# Patient Record
Sex: FEMALE | URBAN - METROPOLITAN AREA
[De-identification: names, ages, dates, MRNs, and addresses within clinical notes are randomized per-mention and may not be internally consistent; named-entity substitution may affect disease eponyms.]

---

## 2021-02-18 ENCOUNTER — APPOINTMENT (RX ONLY)
Dept: URBAN - METROPOLITAN AREA CLINIC 133 | Facility: CLINIC | Age: 31
Setting detail: DERMATOLOGY
End: 2021-02-18

## 2021-02-18 VITALS — TEMPERATURE: 97.3 F | HEIGHT: 62 IN | WEIGHT: 180 LBS

## 2021-02-18 VITALS — WEIGHT: 180 LBS | HEIGHT: 62 IN

## 2021-02-18 DIAGNOSIS — L21.8 OTHER SEBORRHEIC DERMATITIS: ICD-10-CM

## 2021-02-18 PROCEDURE — ? TREATMENT REGIMEN

## 2021-02-18 PROCEDURE — ? OBSERVATION

## 2021-02-18 PROCEDURE — ? PRESCRIPTION

## 2021-02-18 PROCEDURE — 99203 OFFICE O/P NEW LOW 30 MIN: CPT

## 2021-02-18 PROCEDURE — ? COUNSELING

## 2021-02-18 RX ORDER — HYDROCORTISONE 25 MG/G
CREAM TOPICAL
Qty: 1 | Refills: 1 | Status: ERX | COMMUNITY
Start: 2021-02-18

## 2021-02-18 RX ORDER — KETOCONAZOLE 20 MG/ML
SHAMPOO, SUSPENSION TOPICAL BIW
Qty: 1 | Refills: 3 | Status: ERX | COMMUNITY
Start: 2021-02-18

## 2021-02-18 RX ADMIN — KETOCONAZOLE: 20 SHAMPOO, SUSPENSION TOPICAL at 00:00

## 2021-02-18 RX ADMIN — HYDROCORTISONE: 25 CREAM TOPICAL at 00:00

## 2021-02-18 ASSESSMENT — LOCATION SIMPLE DESCRIPTION DERM
LOCATION SIMPLE: LEFT FOREHEAD
LOCATION SIMPLE: POSTERIOR SCALP

## 2021-02-18 ASSESSMENT — LOCATION DETAILED DESCRIPTION DERM
LOCATION DETAILED: POSTERIOR MID-PARIETAL SCALP
LOCATION DETAILED: LEFT SUPERIOR MEDIAL FOREHEAD

## 2021-02-18 ASSESSMENT — LOCATION ZONE DERM
LOCATION ZONE: SCALP
LOCATION ZONE: FACE

## 2021-02-18 NOTE — HPI: RASH
What Type Of Note Output Would You Prefer (Optional)?: Standard Output
Is The Patient Presenting As Previously Scheduled?: Yes
How Severe Is Your Rash?: mild
Is This A New Presentation, Or A Follow-Up?: Rash
Additional History: Patient states she regularly uses TGel extra strength, which helps to control her dandruff. \\nPatient stated she’s had a similar rash before on the back of her scalp that resolved on its own. Patient has noticed an increase in hair loss within the last few weeks, but unsure if it’s related.

## 2021-02-18 NOTE — PROCEDURE: TREATMENT REGIMEN
Detail Level: Zone
Initiate Treatment: Ketoconazole shampoo - alternate between regular shampoo (T-gel) \\nHydrocortisone 2.5% cream to affected areas on forehead

## 2021-03-16 ENCOUNTER — APPOINTMENT (RX ONLY)
Dept: URBAN - METROPOLITAN AREA CLINIC 133 | Facility: CLINIC | Age: 31
Setting detail: DERMATOLOGY
End: 2021-03-16

## 2021-03-16 DIAGNOSIS — L63.8 OTHER ALOPECIA AREATA: ICD-10-CM

## 2021-03-16 DIAGNOSIS — L21.8 OTHER SEBORRHEIC DERMATITIS: ICD-10-CM

## 2021-03-16 PROBLEM — L30.9 DERMATITIS, UNSPECIFIED: Status: ACTIVE | Noted: 2021-03-16

## 2021-03-16 PROCEDURE — ? TREATMENT REGIMEN

## 2021-03-16 PROCEDURE — 99213 OFFICE O/P EST LOW 20 MIN: CPT

## 2021-03-16 PROCEDURE — ? PHOTO-DOCUMENTATION

## 2021-03-16 PROCEDURE — ? PRESCRIPTION

## 2021-03-16 PROCEDURE — ? OBSERVATION

## 2021-03-16 PROCEDURE — ? COUNSELING

## 2021-03-16 PROCEDURE — ? OBSERVATION AND MEASURE

## 2021-03-16 RX ORDER — CLOBETASOL PROPIONATE 0.5 MG/ML
SOLUTION TOPICAL
Qty: 1 | Refills: 1 | Status: ERX | COMMUNITY
Start: 2021-03-16

## 2021-03-16 RX ADMIN — CLOBETASOL PROPIONATE: 0.5 SOLUTION TOPICAL at 00:00

## 2021-03-16 ASSESSMENT — LOCATION SIMPLE DESCRIPTION DERM
LOCATION SIMPLE: ANTERIOR SCALP
LOCATION SIMPLE: LEFT FOREHEAD

## 2021-03-16 ASSESSMENT — LOCATION DETAILED DESCRIPTION DERM
LOCATION DETAILED: LEFT SUPERIOR MEDIAL FOREHEAD
LOCATION DETAILED: MID-FRONTAL SCALP

## 2021-03-16 ASSESSMENT — LOCATION ZONE DERM
LOCATION ZONE: FACE
LOCATION ZONE: SCALP

## 2021-03-16 NOTE — PROCEDURE: OBSERVATION
Detail Level: Detailed
Size Of Lesion: 2x2.5cm
Detail Level: Simple
X Size Of Lesion In Cm (Optional): 0

## 2021-03-16 NOTE — PROCEDURE: TREATMENT REGIMEN
Plan: Consider intralesional kenalog if not improved or worsening
Detail Level: Zone
Continue Regimen: Ketoconazole shampoo - alternate between regular shampoo (T-gel)
Discontinue Regimen: Hydrocortisone 2.5% cream
Plan: If not improved in one month, discussed a biopsy to r/o lupus or other photosensitive conditions. Per patient, no family history of lupus, no personal history of autoimmune diseases.\\nInformed to use clobetasol solution once daily for 2 weeks and then stop. Counseled that she shouldn’t put anything on the area for at least 1 week before followup in anticipation of possible biopsy.
Initiate Treatment: Clobetasol solution once daily (lesion is along frontal hairline)
Initiate Treatment: Clobetasol solution 1-2 times daily

## 2021-09-15 ENCOUNTER — APPOINTMENT (RX ONLY)
Dept: URBAN - METROPOLITAN AREA CLINIC 133 | Facility: CLINIC | Age: 31
Setting detail: DERMATOLOGY
End: 2021-09-15

## 2021-09-15 VITALS — HEIGHT: 61 IN | WEIGHT: 195 LBS

## 2021-09-15 VITALS — HEIGHT: 61 IN | WEIGHT: 203 LBS

## 2021-09-15 DIAGNOSIS — L21.8 OTHER SEBORRHEIC DERMATITIS: ICD-10-CM | Status: INADEQUATELY CONTROLLED

## 2021-09-15 PROBLEM — L30.9 DERMATITIS, UNSPECIFIED: Status: ACTIVE | Noted: 2021-09-15

## 2021-09-15 PROCEDURE — ? COUNSELING

## 2021-09-15 PROCEDURE — 11104 PUNCH BX SKIN SINGLE LESION: CPT

## 2021-09-15 PROCEDURE — ? ADDITIONAL NOTES

## 2021-09-15 PROCEDURE — ? BIOPSY BY PUNCH METHOD

## 2021-09-15 ASSESSMENT — LOCATION ZONE DERM: LOCATION ZONE: FACE

## 2021-09-15 ASSESSMENT — LOCATION SIMPLE DESCRIPTION DERM: LOCATION SIMPLE: LEFT FOREHEAD

## 2021-09-15 ASSESSMENT — LOCATION DETAILED DESCRIPTION DERM: LOCATION DETAILED: LEFT SUPERIOR FOREHEAD

## 2021-09-15 NOTE — PROCEDURE: ADDITIONAL NOTES
Additional Notes: Patient was using clobetasol solution but will hold off on additional treatment recommendations pending biopsy results.\\nWe did discuss wearing sunscreen daily.
Detail Level: Simple
Render Risk Assessment In Note?: no

## 2021-09-15 NOTE — PROCEDURE: BIOPSY BY PUNCH METHOD
X Size Of Lesion In Cm (Optional): 0
Home Suture Removal Text: Patient was provided a home suture removal kit and will remove their sutures at home.  If they have any questions or difficulties they will call the office.
Hemostasis: None
Suture Removal: 7 days
Post-Care Instructions: I reviewed with the patient in detail post-care instructions. Patient is to keep the biopsy site dry overnight, and then apply bacitracin twice daily until healed. Patient may apply hydrogen peroxide soaks to remove any crusting.
Notification Instructions: Patient will be notified of biopsy results. However, patient instructed to call the office if not contacted within 2 weeks.
Patient Will Remove Sutures At Home?: No
Dressing: bandage
Consent: Written consent was obtained and risks were reviewed including but not limited to scarring, infection, bleeding, scabbing, incomplete removal, nerve damage and allergy to anesthesia.
Information: Selecting Yes will display possible errors in your note based on the variables you have selected. This validation is only offered as a suggestion for you. PLEASE NOTE THAT THE VALIDATION TEXT WILL BE REMOVED WHEN YOU FINALIZE YOUR NOTE. IF YOU WANT TO FAX A PRELIMINARY NOTE YOU WILL NEED TO TOGGLE THIS TO 'NO' IF YOU DO NOT WANT IT IN YOUR FAXED NOTE.
Epidermal Sutures: 6-0 Ethilon
Detail Level: Detailed
Biopsy Type: H and E
Anesthesia Volume In Cc: 0.5
Billing Type: Third-Party Bill
Wound Care: Petrolatum
Was A Bandage Applied: Yes
Punch Size In Mm: 3
Anesthesia Type: 1% lidocaine with epinephrine
Path Notes Override (Will Replace All Of The Above Text): Lesion has been present for 6+ months. Started as erythematous annular plaque (see photos in chart from March 16) and has evolved to a uniform plaque.

## 2021-09-22 ENCOUNTER — APPOINTMENT (RX ONLY)
Dept: URBAN - METROPOLITAN AREA CLINIC 133 | Facility: CLINIC | Age: 31
Setting detail: DERMATOLOGY
End: 2021-09-22

## 2021-09-22 DIAGNOSIS — L93.2 OTHER LOCAL LUPUS ERYTHEMATOSUS: ICD-10-CM | Status: INADEQUATELY CONTROLLED

## 2021-09-22 DIAGNOSIS — M79.3 PANNICULITIS, UNSPECIFIED: ICD-10-CM

## 2021-09-22 DIAGNOSIS — Z48.02 ENCOUNTER FOR REMOVAL OF SUTURES: ICD-10-CM

## 2021-09-22 PROCEDURE — ? COUNSELING

## 2021-09-22 PROCEDURE — ? SUTURE REMOVAL (GLOBAL PERIOD)

## 2021-09-22 PROCEDURE — ? DEFER

## 2021-09-22 PROCEDURE — ? ORDER TESTS

## 2021-09-22 PROCEDURE — ? PRESCRIPTION

## 2021-09-22 PROCEDURE — 99214 OFFICE O/P EST MOD 30 MIN: CPT | Mod: 25

## 2021-09-22 PROCEDURE — ? TREATMENT REGIMEN

## 2021-09-22 PROCEDURE — ? INTRALESIONAL KENALOG

## 2021-09-22 PROCEDURE — 11900 INJECT SKIN LESIONS </W 7: CPT

## 2021-09-22 RX ORDER — HYDROXYCHLOROQUINE SULFATE 200 MG/1
TABLET, FILM COATED ORAL
Qty: 60 | Refills: 5 | Status: ERX | COMMUNITY
Start: 2021-09-22

## 2021-09-22 RX ORDER — BETAMETHASONE DIPROPIONATE 0.5 MG/G
OINTMENT TOPICAL
Qty: 45 | Refills: 0 | Status: ERX | COMMUNITY
Start: 2021-09-22

## 2021-09-22 RX ADMIN — HYDROXYCHLOROQUINE SULFATE: 200 TABLET, FILM COATED ORAL at 00:00

## 2021-09-22 RX ADMIN — BETAMETHASONE DIPROPIONATE: 0.5 OINTMENT TOPICAL at 00:00

## 2021-09-22 ASSESSMENT — LOCATION DETAILED DESCRIPTION DERM
LOCATION DETAILED: LEFT NASAL SIDEWALL
LOCATION DETAILED: LEFT MEDIAL FRONTAL SCALP
LOCATION DETAILED: LEFT SUPERIOR FOREHEAD
LOCATION DETAILED: LEFT SUPERIOR MEDIAL FOREHEAD
LOCATION DETAILED: RIGHT PROXIMAL POSTERIOR UPPER ARM
LOCATION DETAILED: RIGHT ANTERIOR LATERAL PROXIMAL THIGH

## 2021-09-22 ASSESSMENT — LOCATION ZONE DERM
LOCATION ZONE: NOSE
LOCATION ZONE: ARM
LOCATION ZONE: SCALP
LOCATION ZONE: FACE
LOCATION ZONE: LEG

## 2021-09-22 ASSESSMENT — LOCATION SIMPLE DESCRIPTION DERM
LOCATION SIMPLE: LEFT FOREHEAD
LOCATION SIMPLE: RIGHT THIGH
LOCATION SIMPLE: LEFT SCALP
LOCATION SIMPLE: LEFT NOSE
LOCATION SIMPLE: RIGHT POSTERIOR UPPER ARM

## 2021-09-22 NOTE — PROCEDURE: TREATMENT REGIMEN
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 She declined biopsy today for the panniculitis.\\nLabs ordered to evaluate for SLE screen.\\nNo response of rash on face to topical steroids, performed intralesional kenalog injection to plaques on forehead and scalp.\\nInitiating plaquenil. Discussed baseline ophthalmology exam within the next few months.\\nFollow up 1 month
Detail Level: Zone
Initiate Treatment: Hydroxychloroquine \\nBetamethasone ointment to painful areas of panniculitis on right upper arm and thigh\\nPhoto protection (mineral sunscreen and hats)
Samples Given: Heliocare supplements

## 2021-09-22 NOTE — PROCEDURE: DEFER
Instructions (Optional): Discussed biopsy for confirmation, but patient declines today. Favor lupus panniculitis due to location and tenderness. If no response to plaquenil, recommended biopsy.
Detail Level: Detailed
Introduction Text (Please End With A Colon): The following procedure was deferred:

## 2021-10-13 NOTE — PROCEDURE: INTRALESIONAL KENALOG
Consent: The risks of atrophy were reviewed with the patient.
Concentration Of Kenalog Solution Injected (Mg/Ml): 2.5
Detail Level: Detailed
Quality 130: Documentation Of Current Medications In The Medical Record: Current Medications Documented
Include Z78.9 (Other Specified Conditions Influencing Health Status) As An Associated Diagnosis?: No
Administered By (Optional): Dr. Susan Garcia
Expiration Date For Kenalog (Optional): 09/2022
Lot # For Kenalog (Optional): [id number]
Treatment Number (Optional): 1
Medical Necessity Clause: This procedure was medically necessary because the lesions that were treated were:
Validate Note Data When Using Inventory: Yes
Detail Level: Detailed
Total Volume (Ccs): 0.5
X Size Of Lesion In Cm (Optional): 0
Kenalog Preparation: Kenalog

## 2021-10-20 ENCOUNTER — APPOINTMENT (RX ONLY)
Dept: URBAN - METROPOLITAN AREA CLINIC 133 | Facility: CLINIC | Age: 31
Setting detail: DERMATOLOGY
End: 2021-10-20

## 2021-10-20 DIAGNOSIS — L30.9 DERMATITIS, UNSPECIFIED: ICD-10-CM

## 2021-10-20 DIAGNOSIS — L93.2 OTHER LOCAL LUPUS ERYTHEMATOSUS: ICD-10-CM

## 2021-10-20 PROCEDURE — ? BIOPSY BY PUNCH METHOD

## 2021-10-20 PROCEDURE — ? TREATMENT REGIMEN

## 2021-10-20 PROCEDURE — ? INTRALESIONAL KENALOG

## 2021-10-20 PROCEDURE — 11900 INJECT SKIN LESIONS </W 7: CPT

## 2021-10-20 PROCEDURE — 11104 PUNCH BX SKIN SINGLE LESION: CPT

## 2021-10-20 PROCEDURE — ? COUNSELING

## 2021-10-20 ASSESSMENT — LOCATION SIMPLE DESCRIPTION DERM
LOCATION SIMPLE: LEFT FOREHEAD
LOCATION SIMPLE: LEFT SCALP
LOCATION SIMPLE: LEFT BUTTOCK
LOCATION SIMPLE: LEFT POSTERIOR THIGH
LOCATION SIMPLE: RIGHT POSTERIOR THIGH
LOCATION SIMPLE: LEFT NOSE
LOCATION SIMPLE: RIGHT POSTERIOR UPPER ARM

## 2021-10-20 ASSESSMENT — LOCATION ZONE DERM
LOCATION ZONE: LEG
LOCATION ZONE: FACE
LOCATION ZONE: NOSE
LOCATION ZONE: SCALP
LOCATION ZONE: TRUNK
LOCATION ZONE: ARM

## 2021-10-20 ASSESSMENT — LOCATION DETAILED DESCRIPTION DERM
LOCATION DETAILED: RIGHT PROXIMAL LATERAL POSTERIOR THIGH
LOCATION DETAILED: LEFT PROXIMAL LATERAL POSTERIOR THIGH
LOCATION DETAILED: RIGHT PROXIMAL POSTERIOR UPPER ARM
LOCATION DETAILED: LEFT SUPERIOR MEDIAL FOREHEAD
LOCATION DETAILED: LEFT LATERAL BUTTOCK
LOCATION DETAILED: LEFT NASAL SIDEWALL
LOCATION DETAILED: LEFT MEDIAL FRONTAL SCALP
LOCATION DETAILED: LEFT BUTTOCK

## 2021-10-20 NOTE — PROCEDURE: INTRALESIONAL KENALOG
Validate Note Data When Using Inventory: Yes
Concentration Of Kenalog Solution Injected (Mg/Ml): 2.5
Include Z78.9 (Other Specified Conditions Influencing Health Status) As An Associated Diagnosis?: No
Ndc# For Kenalog Only: 5625-2025-14
Administered By (Optional): Dr. Susan Garcia
Detail Level: Detailed
Consent: The risks of atrophy were reviewed with the patient.
Medical Necessity Clause: This procedure was medically necessary because the lesions that were treated were:
X Size Of Lesion In Cm (Optional): 0
Kenalog Preparation: Kenalog
Total Volume (Ccs): 0.1

## 2021-10-20 NOTE — PROCEDURE: BIOPSY BY PUNCH METHOD
Bill 36183 For Specimen Handling/Conveyance To Laboratory?: no
Anesthesia Volume In Cc: 1.5
Additional Anesthesia Volume In Cc (Will Not Render If 0): 0
Wound Care: Petrolatum
Validate Note Data (See Information Below): Yes
Number Of Epidermal Sutures (Optional): 2
Epidermal Sutures: 4-0 Ethilon
Detail Level: Detailed
Hemostasis: None
Billing Type: Third-Party Bill
Dressing: bandage
Consent: Written consent was obtained and risks were reviewed including but not limited to scarring, infection, bleeding, scabbing, incomplete removal, nerve damage and allergy to anesthesia.
Anesthesia Type: 1% lidocaine with epinephrine
Biopsy Type: H and E
Path Notes Override (Will Replace All Of The Above Text): Prior biopsy on forehead consistent with lupus. Pt now developed firm plaques on proximal extremities. Favor lupus panniculitis. 2 specimens in container, one with epidermis and second with deeper fat.
Notification Instructions: Patient will be notified of biopsy results. However, patient instructed to call the office if not contacted within 2 weeks.
Home Suture Removal Text: Patient was provided a home suture removal kit and will remove their sutures at home.  If they have any questions or difficulties they will call the office.
Suture Removal: 10 days
Punch Size In Mm: 4
Information: Selecting Yes will display possible errors in your note based on the variables you have selected. This validation is only offered as a suggestion for you. PLEASE NOTE THAT THE VALIDATION TEXT WILL BE REMOVED WHEN YOU FINALIZE YOUR NOTE. IF YOU WANT TO FAX A PRELIMINARY NOTE YOU WILL NEED TO TOGGLE THIS TO 'NO' IF YOU DO NOT WANT IT IN YOUR FAXED NOTE.
Post-Care Instructions: I reviewed with the patient in detail post-care instructions. Patient is to keep the biopsy site dry overnight, and then apply bacitracin twice daily until healed. Patient may apply hydrogen peroxide soaks to remove any crusting.

## 2021-10-20 NOTE — PROCEDURE: TREATMENT REGIMEN
Detail Level: Zone
Plan: Pt still needs baseline ophthalmology exam within the next few months.\\nFavor lupus panniculitis. With associated pain, may consider adding prednisone pending biopsy results.\\nRheumatology ordered additional labs. Will discuss biopsy findings with them for further pt mgmt
Continue Regimen: Hydroxychloroquine \\nPhoto protection (mineral sunscreen and hats)
Discontinue Regimen: Betamethasone ointment

## 2021-10-28 ENCOUNTER — RX ONLY (OUTPATIENT)
Age: 31
Setting detail: RX ONLY
End: 2021-10-28

## 2021-10-28 RX ORDER — PREDNISONE 10 MG/1
TABLET ORAL
Qty: 42 | Refills: 0 | Status: ERX | COMMUNITY
Start: 2021-10-27

## 2021-11-02 ENCOUNTER — APPOINTMENT (RX ONLY)
Dept: URBAN - METROPOLITAN AREA CLINIC 133 | Facility: CLINIC | Age: 31
Setting detail: DERMATOLOGY
End: 2021-11-02

## 2021-11-02 VITALS — WEIGHT: 195 LBS | HEIGHT: 62 IN

## 2021-11-02 DIAGNOSIS — L93.2 OTHER LOCAL LUPUS ERYTHEMATOSUS: ICD-10-CM | Status: WORSENING

## 2021-11-02 DIAGNOSIS — L93.2 OTHER LOCAL LUPUS ERYTHEMATOSUS: ICD-10-CM

## 2021-11-02 DIAGNOSIS — Z48.02 ENCOUNTER FOR REMOVAL OF SUTURES: ICD-10-CM

## 2021-11-02 PROCEDURE — ? TREATMENT REGIMEN

## 2021-11-02 PROCEDURE — ? COUNSELING

## 2021-11-02 PROCEDURE — ? PHOTO-DOCUMENTATION

## 2021-11-02 PROCEDURE — ? SUTURE REMOVAL (GLOBAL PERIOD)

## 2021-11-02 PROCEDURE — 99213 OFFICE O/P EST LOW 20 MIN: CPT

## 2021-11-02 ASSESSMENT — LOCATION ZONE DERM
LOCATION ZONE: LEG
LOCATION ZONE: NOSE
LOCATION ZONE: TRUNK
LOCATION ZONE: ARM
LOCATION ZONE: SCALP
LOCATION ZONE: FACE

## 2021-11-02 ASSESSMENT — LOCATION SIMPLE DESCRIPTION DERM
LOCATION SIMPLE: RIGHT POSTERIOR UPPER ARM
LOCATION SIMPLE: LEFT FOREHEAD
LOCATION SIMPLE: LEFT BUTTOCK
LOCATION SIMPLE: RIGHT POSTERIOR THIGH
LOCATION SIMPLE: LEFT POSTERIOR THIGH
LOCATION SIMPLE: LEFT NOSE
LOCATION SIMPLE: LEFT SCALP

## 2021-11-02 ASSESSMENT — LOCATION DETAILED DESCRIPTION DERM
LOCATION DETAILED: LEFT BUTTOCK
LOCATION DETAILED: LEFT NASAL SIDEWALL
LOCATION DETAILED: LEFT PROXIMAL LATERAL POSTERIOR THIGH
LOCATION DETAILED: LEFT MEDIAL FRONTAL SCALP
LOCATION DETAILED: RIGHT PROXIMAL LATERAL POSTERIOR THIGH
LOCATION DETAILED: LEFT SUPERIOR MEDIAL FOREHEAD
LOCATION DETAILED: RIGHT PROXIMAL POSTERIOR UPPER ARM

## 2021-11-02 NOTE — PROCEDURE: SUTURE REMOVAL (GLOBAL PERIOD)
Detail Level: Detailed
Body Location Override (Optional - Billing Will Still Be Based On Selected Body Map Location If Applicable): left buttock
Add 43150 Cpt? (Important Note: In 2017 The Use Of 85277 Is Being Tracked By Cms To Determine Future Global Period Reimbursement For Global Periods): no

## 2021-11-02 NOTE — PROCEDURE: TREATMENT REGIMEN
Continue Regimen: Hydroxychloroquine \\nPhoto protection (mineral sunscreen and hats)
Detail Level: Zone
Discontinue Regimen: Betamethasone ointment
Continue Regimen: Prednisone taper\\nPlaquenil\\nBetamethasone, bid
Plan: Pt still needs baseline ophthalmology exam within the next few months.\\nFavor this may be developing to more of a DLE subtype. No new lesions. Continue to monitor
Plan: Compared to images from 10/20/21, patient has worsened with her panniculitis, and it was very inflamed on exam today and symptomatic for her. Discussed increasing prednisone, but patient prefers to hold off for now to see if it improves after she's been on prednisone for a little longer. Counseled her if symptoms improve but start to worsen at lower doses, to call me so that we can continue on the lowest dose possible to control symptoms. Could also consider ILK to a limited surface area to see if she improves, risk is development of atrophy.\\nShe will continue the plaquenil. Not a good candidate to add MTX due to NAFLD. Her rheumatologist (Dr. Ward) is adding Benlysta for her, but will be a while before she's started. She will follow up in 1 month.

## 2021-11-30 ENCOUNTER — APPOINTMENT (RX ONLY)
Dept: URBAN - METROPOLITAN AREA CLINIC 133 | Facility: CLINIC | Age: 31
Setting detail: DERMATOLOGY
End: 2021-11-30

## 2021-11-30 VITALS — WEIGHT: 195 LBS | HEIGHT: 62 IN

## 2021-11-30 DIAGNOSIS — L93.0 DISCOID LUPUS ERYTHEMATOSUS: ICD-10-CM

## 2021-11-30 DIAGNOSIS — L93.2 OTHER LOCAL LUPUS ERYTHEMATOSUS: ICD-10-CM | Status: IMPROVED

## 2021-11-30 PROCEDURE — 99214 OFFICE O/P EST MOD 30 MIN: CPT

## 2021-11-30 PROCEDURE — ? PHOTO-DOCUMENTATION

## 2021-11-30 PROCEDURE — ? TREATMENT REGIMEN

## 2021-11-30 PROCEDURE — ? COUNSELING

## 2021-11-30 PROCEDURE — ? PRESCRIPTION

## 2021-11-30 ASSESSMENT — LOCATION SIMPLE DESCRIPTION DERM
LOCATION SIMPLE: LEFT SCALP
LOCATION SIMPLE: LEFT BUTTOCK
LOCATION SIMPLE: LEFT FOREHEAD
LOCATION SIMPLE: RIGHT POSTERIOR UPPER ARM
LOCATION SIMPLE: RIGHT POSTERIOR THIGH
LOCATION SIMPLE: LEFT NOSE
LOCATION SIMPLE: LEFT EAR
LOCATION SIMPLE: LEFT POSTERIOR THIGH

## 2021-11-30 ASSESSMENT — LOCATION ZONE DERM
LOCATION ZONE: FACE
LOCATION ZONE: ARM
LOCATION ZONE: SCALP
LOCATION ZONE: TRUNK
LOCATION ZONE: LEG
LOCATION ZONE: EAR
LOCATION ZONE: NOSE

## 2021-11-30 ASSESSMENT — LOCATION DETAILED DESCRIPTION DERM
LOCATION DETAILED: LEFT NASAL SIDEWALL
LOCATION DETAILED: LEFT PROXIMAL LATERAL POSTERIOR THIGH
LOCATION DETAILED: LEFT MEDIAL FRONTAL SCALP
LOCATION DETAILED: RIGHT PROXIMAL POSTERIOR UPPER ARM
LOCATION DETAILED: RIGHT PROXIMAL LATERAL POSTERIOR THIGH
LOCATION DETAILED: LEFT BUTTOCK
LOCATION DETAILED: LEFT SUPERIOR MEDIAL FOREHEAD
LOCATION DETAILED: LEFT CRUS OF HELIX

## 2021-11-30 NOTE — HPI: SECONDARY COMPLAINT
How Severe Is This Condition?: mild
Additional History: Patient is not sure what is in her ear. She can feel it although it is not painful.

## 2021-11-30 NOTE — PROCEDURE: TREATMENT REGIMEN
Otc Regimen: Photo protection (mineral sunscreen and hats).
Plan: Will treat with potent topical steroid, if no improvement at follow up add ILK.
Detail Level: Zone
Continue Regimen: Plaquenil 200mg bid\\nBetamethasone, bid
Initiate Treatment: Betamethasone bid for 2 weeks then follow up
Plan: Erythema on top of panniculitis has improved after prednisone taper. There is still induration on exam. She has not yet started her Benlysta from rheumatology. \\nCould consider ILK to a limited surface area to see if she improves, risk is development of atrophy.\\nPt still needs baseline ophthalmology exam within the next few months.\\n\\nShe will follow up in 3 weeks. Since she is continuing to develop new lesions, may refer to [facility] for a second opinion on treatment regimen.

## 2021-12-01 RX ORDER — BETAMETHASONE DIPROPIONATE 0.5 MG/G
OINTMENT TOPICAL
Qty: 45 | Refills: 1 | Status: ERX

## 2021-12-22 ENCOUNTER — APPOINTMENT (RX ONLY)
Dept: URBAN - METROPOLITAN AREA CLINIC 133 | Facility: CLINIC | Age: 31
Setting detail: DERMATOLOGY
End: 2021-12-22

## 2021-12-22 VITALS — WEIGHT: 195 LBS | HEIGHT: 62 IN

## 2021-12-22 DIAGNOSIS — L93.2 OTHER LOCAL LUPUS ERYTHEMATOSUS: ICD-10-CM | Status: WORSENING

## 2021-12-22 DIAGNOSIS — L93.0 DISCOID LUPUS ERYTHEMATOSUS: ICD-10-CM | Status: WORSENING

## 2021-12-22 DIAGNOSIS — L30.9 DERMATITIS, UNSPECIFIED: ICD-10-CM

## 2021-12-22 PROCEDURE — ? ADDITIONAL NOTES

## 2021-12-22 PROCEDURE — ? COUNSELING

## 2021-12-22 PROCEDURE — ? TREATMENT REGIMEN

## 2021-12-22 PROCEDURE — ? PHOTO-DOCUMENTATION

## 2021-12-22 PROCEDURE — ? REFERRAL

## 2021-12-22 PROCEDURE — 11900 INJECT SKIN LESIONS </W 7: CPT

## 2021-12-22 PROCEDURE — ? INTRALESIONAL KENALOG

## 2021-12-22 PROCEDURE — 99214 OFFICE O/P EST MOD 30 MIN: CPT | Mod: 25

## 2021-12-22 ASSESSMENT — LOCATION SIMPLE DESCRIPTION DERM
LOCATION SIMPLE: LEFT INDEX FINGER
LOCATION SIMPLE: LEFT FOREARM
LOCATION SIMPLE: RIGHT POSTERIOR UPPER ARM
LOCATION SIMPLE: LEFT BUTTOCK
LOCATION SIMPLE: LEFT NOSE
LOCATION SIMPLE: LEFT MIDDLE FINGER
LOCATION SIMPLE: LEFT SCALP
LOCATION SIMPLE: RIGHT RING FINGER
LOCATION SIMPLE: LEFT EAR
LOCATION SIMPLE: RIGHT THUMB
LOCATION SIMPLE: RIGHT UPPER ARM
LOCATION SIMPLE: LEFT POSTERIOR THIGH
LOCATION SIMPLE: NOSE
LOCATION SIMPLE: RIGHT MIDDLE FINGER
LOCATION SIMPLE: LEFT UPPER BACK
LOCATION SIMPLE: RIGHT POSTERIOR THIGH
LOCATION SIMPLE: LEFT FOREHEAD

## 2021-12-22 ASSESSMENT — LOCATION ZONE DERM
LOCATION ZONE: FINGER
LOCATION ZONE: EAR
LOCATION ZONE: ARM
LOCATION ZONE: LEG
LOCATION ZONE: FACE
LOCATION ZONE: NOSE
LOCATION ZONE: SCALP
LOCATION ZONE: TRUNK

## 2021-12-22 ASSESSMENT — LOCATION DETAILED DESCRIPTION DERM
LOCATION DETAILED: LEFT BUTTOCK
LOCATION DETAILED: RIGHT PROXIMAL POSTERIOR UPPER ARM
LOCATION DETAILED: LEFT MEDIAL FRONTAL SCALP
LOCATION DETAILED: RIGHT PROXIMAL POSTERIOR THIGH
LOCATION DETAILED: LEFT DISTAL DORSAL FOREARM
LOCATION DETAILED: LEFT SUPERIOR UPPER BACK
LOCATION DETAILED: LEFT PROXIMAL POSTERIOR THIGH
LOCATION DETAILED: RIGHT ANTERIOR PROXIMAL UPPER ARM
LOCATION DETAILED: NASAL DORSUM
LOCATION DETAILED: LEFT CRUS OF HELIX
LOCATION DETAILED: RIGHT DISTAL RADIAL THUMB
LOCATION DETAILED: LEFT DISTAL PALMAR MIDDLE FINGER
LOCATION DETAILED: RIGHT DISTAL PALMAR MIDDLE FINGER
LOCATION DETAILED: RIGHT PROXIMAL PALMAR RING FINGER
LOCATION DETAILED: LEFT NASAL SIDEWALL
LOCATION DETAILED: LEFT DISTAL PALMAR INDEX FINGER
LOCATION DETAILED: LEFT SUPERIOR MEDIAL FOREHEAD

## 2021-12-22 NOTE — PROCEDURE: PHOTO-DOCUMENTATION
Photo Preface (Leave Blank If You Do Not Want): Photographs were obtained today
Detail Level: Detailed
Detail Level: Simple

## 2021-12-22 NOTE — PROCEDURE: INTRALESIONAL KENALOG
Consent: The risks of atrophy were reviewed with the patient.
Include Z78.9 (Other Specified Conditions Influencing Health Status) As An Associated Diagnosis?: No
Concentration Of Kenalog Solution Injected (Mg/Ml): 2.5
Treatment Number (Optional): 1
Ndc# For Kenalog Only: 3706-2633-19
Validate Note Data When Using Inventory: Yes
Medical Necessity Clause: This procedure was medically necessary because the lesions that were treated were:
Detail Level: Zone
X Size Of Lesion In Cm (Optional): 0
Administered By (Optional): Dr. Susan Garcia
Detail Level: Simple
Kenalog Preparation: Kenalog
Total Volume (Ccs): 0.5

## 2021-12-22 NOTE — PROCEDURE: TREATMENT REGIMEN
Detail Level: Zone
Plan: Used betamethasone with minimal improvement and spreading. ILK added today.
Plan: Patient is continuing to develop new areas of involvement. Has been on plaquenil for about 3 months at this point. Discussed this medication is usually slow in onset to see efficacy, but she is having a lot of pain in some of her plaques and overall condition is worsening. She completed a course of prednisone which helped initially. May consider an additional course if no relief from ILK today.\\nShe has had her first dose of Benlysta from rheumatology, but I do not think this will provide any benefit for the skin. Her rheumatologist is at [facility]. Recommended referral for a second opinion on treatment options from their dermatology department as well. She has her next rheum appt on Jan 4, and would be ideal to be able to see derm that day as well if possible.
Continue Regimen: Plaquenil 200mg bid\\nBetamethasone ointment, bid
Otc Regimen: Photo protection (mineral sunscreen and hats)

## 2021-12-22 NOTE — PROCEDURE: ADDITIONAL NOTES
Render Risk Assessment In Note?: no
Additional Notes: Lesions on hands are new per patient. Could be associated with lupus or possible MCTD. Will request blood work of antibody panel drawn by rheumatology. She may use betamethasone and aquaphor.
Detail Level: Simple

## 2024-01-08 NOTE — PROCEDURE: SUTURE REMOVAL (GLOBAL PERIOD)
Detail Level: Detailed
No
Add 91935 Cpt? (Important Note: In 2017 The Use Of 35297 Is Being Tracked By Cms To Determine Future Global Period Reimbursement For Global Periods): no